# Patient Record
Sex: MALE | Race: WHITE | NOT HISPANIC OR LATINO | Employment: FULL TIME | URBAN - METROPOLITAN AREA
[De-identification: names, ages, dates, MRNs, and addresses within clinical notes are randomized per-mention and may not be internally consistent; named-entity substitution may affect disease eponyms.]

---

## 2017-09-07 ENCOUNTER — HOSPITAL ENCOUNTER (OUTPATIENT)
Dept: RADIOLOGY | Facility: HOSPITAL | Age: 62
Discharge: HOME | End: 2017-09-07
Attending: INTERNAL MEDICINE
Payer: OTHER GOVERNMENT

## 2017-09-07 ENCOUNTER — OFFICE VISIT (OUTPATIENT)
Dept: PULMONOLOGY | Facility: CLINIC | Age: 62
End: 2017-09-07
Payer: OTHER GOVERNMENT

## 2017-09-07 VITALS
DIASTOLIC BLOOD PRESSURE: 80 MMHG | HEART RATE: 48 BPM | WEIGHT: 241.63 LBS | RESPIRATION RATE: 18 BRPM | HEIGHT: 70 IN | SYSTOLIC BLOOD PRESSURE: 120 MMHG | BODY MASS INDEX: 34.59 KG/M2 | OXYGEN SATURATION: 97 %

## 2017-09-07 DIAGNOSIS — Z77.090 H/O ASBESTOS EXPOSURE: Chronic | ICD-10-CM

## 2017-09-07 DIAGNOSIS — E66.9 OBESITY (BMI 30-39.9): Chronic | ICD-10-CM

## 2017-09-07 DIAGNOSIS — G47.33 OSA ON CPAP: Chronic | ICD-10-CM

## 2017-09-07 DIAGNOSIS — R91.8 MULTIPLE PULMONARY NODULES DETERMINED BY COMPUTED TOMOGRAPHY OF LUNG: Chronic | ICD-10-CM

## 2017-09-07 DIAGNOSIS — R91.8 MULTIPLE PULMONARY NODULES DETERMINED BY COMPUTED TOMOGRAPHY OF LUNG: Primary | Chronic | ICD-10-CM

## 2017-09-07 DIAGNOSIS — J98.4 PULMONARY SCARRING: Chronic | ICD-10-CM

## 2017-09-07 LAB
CREAT SERPL-MCNC: 1.1 MG/DL
EST. GFR  (AFRICAN AMERICAN): >60 ML/MIN/1.73 M^2
EST. GFR  (NON AFRICAN AMERICAN): >60 ML/MIN/1.73 M^2

## 2017-09-07 PROCEDURE — 71260 CT THORAX DX C+: CPT | Mod: TC

## 2017-09-07 PROCEDURE — 3008F BODY MASS INDEX DOCD: CPT | Mod: ,,, | Performed by: INTERNAL MEDICINE

## 2017-09-07 PROCEDURE — 25500020 PHARM REV CODE 255: Performed by: INTERNAL MEDICINE

## 2017-09-07 PROCEDURE — 99999 PR PBB SHADOW E&M-EST. PATIENT-LVL III: CPT | Mod: PBBFAC,,, | Performed by: INTERNAL MEDICINE

## 2017-09-07 PROCEDURE — 82565 ASSAY OF CREATININE: CPT

## 2017-09-07 PROCEDURE — 99213 OFFICE O/P EST LOW 20 MIN: CPT | Mod: PBBFAC,25 | Performed by: INTERNAL MEDICINE

## 2017-09-07 PROCEDURE — 99214 OFFICE O/P EST MOD 30 MIN: CPT | Mod: S$PBB,,, | Performed by: INTERNAL MEDICINE

## 2017-09-07 RX ADMIN — IOHEXOL 75 ML: 350 INJECTION, SOLUTION INTRAVENOUS at 09:09

## 2017-09-07 NOTE — PATIENT INSTRUCTIONS
Lung Anatomy  Your lungs take air in to give your body oxygen, which the body needs to work. Your lungs, like all the tissues in your body, are made up of billions of tiny specialized cells. Old lung cells die and are replaced by new, identical lung cells. This natural process helps ensure healthy lungs.    Date Last Reviewed: 11/1/2016  © 0881-3085 [employer]. [address] [place] [address]. All rights reserved. This information is not intended as a substitute for professional medical care. Always follow your healthcare professional's instructions.

## 2017-09-07 NOTE — PROGRESS NOTES
Subjective:      Established patient    Patient ID: Sebastián Diaz is a 62 y.o. male.    Patient Active Problem List   Diagnosis    H/O asbestos exposure    Multiple pulmonary nodules determined by computed tomography of lung - Stable Radiologically    REY on CPAP    Obesity (BMI 30-39.9)       Problem list has been reviewed.    Chief Complaint: Pulmonary scarring; multiple pulmonary nodules determined by computed tomography; and H/O abestos exposure      HPI    CT chest reviewed with patient who expressed and voiced understanding. All questions were answered to the patients satisfaction.  He states that he  is fine and has no specific pulmonary complaints.  He denies cough sputum, hemoptysis,  pain with breathing, wheezing, asthma.   A full  review of systems, past , family  and social histories was performed except as mentioned in the note above, these are non contributory to the main issues discussed  today      He has REY and is on CPAP of 11 CMWP. He uses his CPAP every night without fail. He definitely thinks PAP is beneficial to his health and he wants to continue with PAP therapy. DME = [facility].     Previous Report Reviewed: office notes     The following portions of the patient's history were reviewed and updated as appropriate: He  has no past medical history on file.  He  has no past surgical history on file.  His family history is not on file.  He  reports that he quit smoking about 18 years ago. His smoking use included Cigarettes. He has quit using smokeless tobacco. He reports that he does not drink alcohol or use drugs.  He has a current medication list which includes the following prescription(s): escitalopram oxalate, pravastatin, and meloxicam.  He is allergic to codeine; hydrocodone; oxycodone; and penicillins..    Review of Systems   Constitutional: Negative for chills, fatigue and night sweats.   HENT: Negative for nosebleeds, postnasal drip, rhinorrhea, sinus pressure, congestion  "and ear pain.    Eyes: Negative for itching.   Respiratory: Positive for cough, wheezing and dyspnea on extertion. Negative for sputum production and orthopnea.         He is on CPAP   Cardiovascular: Negative for chest pain, palpitations and leg swelling.   Genitourinary: Negative for difficulty urinating.   Endocrine: Negative for cold intolerance and heat intolerance.    Musculoskeletal: Positive for arthralgias and back pain.   Gastrointestinal: Positive for acid reflux. Negative for nausea, vomiting and abdominal pain.        Hematochezia   Neurological: Negative for dizziness, light-headedness and headaches.   Hematological: No excessive bruising.   Psychiatric/Behavioral: The patient is nervous/anxious.    All other systems reviewed and are negative.       Objective:     /80   Pulse (!) 48   Resp 18   Ht 5' 10" (1.778 m)   Wt 109.6 kg (241 lb 10 oz)   SpO2 97%   BMI 34.67 kg/m²   Body mass index is 34.67 kg/m².     Physical Exam   Constitutional: He is oriented to person, place, and time. He appears well-developed and well-nourished.   HENT:   Head: Normocephalic and atraumatic.   Eyes: EOM are normal. Pupils are equal, round, and reactive to light.   Neck: Normal range of motion.   Cardiovascular: Normal rate and regular rhythm.    Pulmonary/Chest: Effort normal and breath sounds normal. He has no wheezes. He has no rales. He exhibits no tenderness.   Abdominal: Soft. Bowel sounds are normal.   Musculoskeletal: Normal range of motion.   Neurological: He is alert and oriented to person, place, and time.   Skin: Skin is warm and dry.   Psychiatric: He has a normal mood and affect.   Vitals reviewed.      Personal Diagnostic Review    CT of chest performed on 09/12/17 with contrast : Multiple nodules again demonstrated, largest measuring up to 7 mm within the right lower lobe which appears stable from one year prior favoring benign etiology.      ABG: pH:7.397   PaO2: 86  PaCO2 38.9   SaO2 97%: " Normal gas exchange    Pulmonary function tests: FEV1:3.03 (84 % predicted), FVC:  4.35 (91 % predicted), FEV1/FVC:  70, T.24 (94 % predicted), RV/TLVC: 30 (81 % predicted), DLCO: 26.4 (98 % predicted): Normal PFT    Six Minute Walk Test: Six minute walk distance is 350.2 (63.78 % predicted) with no dyspnea. Peak VO2 during walking was 14.5  Ml/min/kg [ 4.14 METS]. During exercise, there was no significant desaturation while breathing room air. Both blood pressure and heart rate remained stable with walking. The patient did not report non-pulmonary symptoms during exercise. The patient did complete the study, walking 360 seconds of the 360 second test. No previous study performed. Based upon age exercise capacity is less than predicted       TRANG INDEX  for COPD Survival Prediction : 0  Approximate 4 year survival interpretation: 80%    TRANG Index Score    0-2             3-4             4-6             7-10  One year mortality      2%            2%            2%              5%  Two year mortality     6%             8%           14%            31%  52 month mortality     19%           32%          40%            80%.    Assessment:     1. Multiple pulmonary nodules determined by computed tomography of lung - Stable Radiologically Stable   2. REY on CPAP Stable   3. Obesity (BMI 30-39.9) Stable     Outpatient Encounter Prescriptions as of 2017   Medication Sig Dispense Refill    escitalopram oxalate (LEXAPRO) 10 MG tablet Take 10 mg by mouth.      pravastatin (PRAVACHOL) 40 MG tablet Take 40 mg by mouth.      meloxicam (MOBIC) 15 MG tablet ONE BY MOUTH EVERY DAY WITH FOOD FOR CHEST PAIN       Facility-Administered Encounter Medications as of 2017   Medication Dose Route Frequency Provider Last Rate Last Dose    [COMPLETED] omnipaque 350 iohexol 75 mL  75 mL Intravenous ONCE PRN Shiraz Springer MD   75 mL at 17 0904     Orders Placed This Encounter   Procedures    CT Chest With Contrast      Standing Status:   Future     Standing Expiration Date:   9/7/2018     Order Specific Question:   Reason for Exam:     Answer:   Multiple lung nodules     Order Specific Question:   Is the patient allergic to iodine or contrast? Has a steroid / antihistamine prep been administered?     Answer:   No     Order Specific Question:   Is the patient on ANY Metformin drug such as Glugophage/Glucovance?           Should be off drug 48 hours after contrast. Check renal function before restart.     Answer:   No     Order Specific Question:   Does the patient have any of the following risk factors?     Answer:   None     Order Specific Question:   Age > 60 years?     Answer:   Yes     Order Specific Question:   History of Kidney Disease - including: decreased kidney function, dialysis, kidney transplay, single kidney, kidney cancer, kidney surgery?     Answer:   None     Order Specific Question:   Does the patient have high blood pressure requiring medical treatment?     Answer:   No     Order Specific Question:   Diabetes?     Answer:   No     Plan:     Discussed diagnosis, its evaluation, treatment and usual course. All questions answered.    Multiple pulmonary nodules determined by computed tomography of lung - Stable Radiologically  Stable and controlled. Continue to monitor.  Repeat CT chest in 1 year.     __________________________________________________________________    Fleischner Society Guidelines:    High risk patient:   Smoking history, history of malignancy or risk factors for malignancy.( non-solid ground glass opacities and partially solid nodules may require longer follow up to exclude indolent adenocarcinoma)      Nodule size Low risk patient High risk patient   4 mm  No follow up Follow up CT in 12 months. If unchanged, no further follow up.   4 - 6 mm Follow up CT in 12 months; if unchanged, no further follow up.  Initial follow up CT in 6 - 12 months, then at 18 - 24 months if no change.      6 - 8 mm   Initial follow up CT at 6 - 12 months and at 18 months if no change.  Initial follow up CT at 3 - 6 months. Then at 9-12 months and 24 months if no change.      > 8 mm Initial follow up CT at 3, 6, 9 and 24 months, dynamic contrast enhanced CT, PET-CT and/or biopsy. Initial follow up CT at 3, 6, 9 and 24 months, dynamic contrast enhanced CT, PET-CT and/or biopsy.       REY on CPAP  Continue CPAP of 11 CMWP. (DME - [facility])     Discussed therapeutic goals for positive airway pressure therapy(CPAP or BiPAP): Ideal is usage 100% of nights for 6 - 8 hours per night. Minimum usage is 70% of night for at least 4 hours per night used. Pateint expressed understanding. All Questions answered.    Obesity (BMI 30-39.9)  Complications associated with obesity reviewed . These include but are not limited to HTN, CAD,CHF, Respiratory disease, Insulin resistance syndrome, hyperlipidemia, atrial fibrillation, cancer  (endometrial, breast, kidney, colorectal, esophageal, renal, pancreatic and gall bladder cancer),GERD and NAFLD. Weight loss approaches reviewd with patient. Combination modality that includes eating behaviour changes, moderate excercise, adjuncvtive pharmacological therapy reviewed.  Patient expressed and verbalized understanding.    TIME SPENT WITH PATIENT: Time spent: 30 minutes in face to face  discussion concerning diagnosis, prognosis, review of lab and test results, benefits of treatment as well as management of disease, counseling of patient and coordination of care between various health  care providers . Greater than half the time spent was used for coordination of care and counseling of patient.          Return in about 1 year (around 9/7/2018) for REY, Multiple lung nodules, Obesity.

## 2017-09-07 NOTE — ASSESSMENT & PLAN NOTE
Continue CPAP of 11 CMWP. ([employer] [facility])     Discussed therapeutic goals for positive airway pressure therapy(CPAP or BiPAP): Ideal is usage 100% of nights for 6 - 8 hours per night. Minimum usage is 70% of night for at least 4 hours per night used. Pateint expressed understanding. All Questions answered.

## 2017-09-07 NOTE — ASSESSMENT & PLAN NOTE
Complications associated with obesity reviewed . These include but are not limited to HTN, CAD,CHF, Respiratory disease, Insulin resistance syndrome, hyperlipidemia, atrial fibrillation, cancer  (endometrial, breast, kidney, colorectal, esophageal, renal, pancreatic and gall bladder cancer),GERD and NAFLD. Weight loss approaches reviewd with patient. Combination modality that includes eating behaviour changes, moderate excercise, adjuncvtive pharmacological therapy reviewed.  Patient expressed and verbalized understanding.

## 2017-09-07 NOTE — ASSESSMENT & PLAN NOTE
Stable and controlled. Continue to monitor.  Repeat CT chest in 1 year.     __________________________________________________________________    Fleischner Society Guidelines:    High risk patient:   Smoking history, history of malignancy or risk factors for malignancy.( non-solid ground glass opacities and partially solid nodules may require longer follow up to exclude indolent adenocarcinoma)      Nodule size Low risk patient High risk patient   4 mm  No follow up Follow up CT in 12 months. If unchanged, no further follow up.   4 - 6 mm Follow up CT in 12 months; if unchanged, no further follow up.  Initial follow up CT in 6 - 12 months, then at 18 - 24 months if no change.      6 - 8 mm  Initial follow up CT at 6 - 12 months and at 18 months if no change.  Initial follow up CT at 3 - 6 months. Then at 9-12 months and 24 months if no change.      > 8 mm Initial follow up CT at 3, 6, 9 and 24 months, dynamic contrast enhanced CT, PET-CT and/or biopsy. Initial follow up CT at 3, 6, 9 and 24 months, dynamic contrast enhanced CT, PET-CT and/or biopsy.